# Patient Record
Sex: MALE | Race: WHITE | Employment: FULL TIME | URBAN - METROPOLITAN AREA
[De-identification: names, ages, dates, MRNs, and addresses within clinical notes are randomized per-mention and may not be internally consistent; named-entity substitution may affect disease eponyms.]

---

## 2017-11-07 ENCOUNTER — OFFICE VISIT (OUTPATIENT)
Dept: CARDIOLOGY CLINIC | Age: 57
End: 2017-11-07

## 2017-11-07 VITALS
DIASTOLIC BLOOD PRESSURE: 70 MMHG | SYSTOLIC BLOOD PRESSURE: 108 MMHG | HEART RATE: 72 BPM | WEIGHT: 183 LBS | HEIGHT: 71 IN | BODY MASS INDEX: 25.62 KG/M2

## 2017-11-07 DIAGNOSIS — Z86.73 STROKE, RECENT, WITHOUT LATE EFFECT: Primary | ICD-10-CM

## 2017-11-07 DIAGNOSIS — Z78.9 EXERCISE TOLERANCE FINDING: ICD-10-CM

## 2017-11-07 DIAGNOSIS — Z71.89 CARDIAC RISK COUNSELING: ICD-10-CM

## 2017-11-07 DIAGNOSIS — Z79.899 ON STATIN THERAPY: ICD-10-CM

## 2017-11-07 DIAGNOSIS — R00.2 PALPITATIONS: ICD-10-CM

## 2017-11-07 RX ORDER — CHOLECALCIFEROL (VITAMIN D3) 125 MCG
CAPSULE ORAL
COMMUNITY

## 2017-11-07 RX ORDER — SIMVASTATIN 5 MG/1
TABLET, FILM COATED ORAL
COMMUNITY

## 2017-11-07 RX ORDER — ASPIRIN 81 MG/1
TABLET ORAL DAILY
COMMUNITY

## 2017-11-07 NOTE — PATIENT INSTRUCTIONS
You will need a stress echocardiogram, Transesophageal echocardiogram, Coronary calcium score and follow up with Dr. Yolis Carrillo in 2 months

## 2017-11-07 NOTE — PROGRESS NOTES
Louis Hamilton     1960       Mika Katz MD, [facility]  Date of Visit-2017   PCP is Shauna Crenshaw MD   [facility] [place]  [facility]  HPI:  Louis Hamilton is a 61 y.o. male   Subjective:  New patient referral from Dr. Ephraim Homans. This gentleman is an avid exerciser, was training for a marathon, running up to 18 miles. On  he suffered a stroke spontaneously, was running, came back home, had a facial droop, lasted 3-4 hours. Went to [facility].  This resolved spontaneously without TPA, though he stayed there a few days. Workup was done per the patient and was unrevealing. He saw his neurologist, who recommended perhaps LUCITA and saw his PCP, who referred him to us. His PCP is Dr. Ephraim Homans. The patient reports no chest pain, chest pressure, PND, orthopnea, claudication, syncope, able to exercise prior to the stroke. He is very worried about going for future exercise. He has a family history of coronary disease. Stroke 10/11/17 with workup 10/11 to 10/13 at [facility].  Is now on aspirin, Simvastatin and vitamin. He said he got some malaise and higher heart rate from 60-80 and he stopped Simvastatin and got better, now is on 2.5. He also states that he's had no bleeding, visual changes, urinary frequency, hematuria or unusual myalgias. He says he had a calcium score with the [facility] last in . His EKG here today, 17, shows sinus rhythm, WNL. Medical History:  Chest discomfort in , [facility] workup, and stroke 17 at [facility]. No prior cath, blood transfusions or ulcers. Social History:  Nonsmoker. Cut back his alcohol to two per week. Drinks occasionally  caffeine. Enjoys golf and running. Works as an executive. Is , has three children. Family History: Mother 68, in good health. Father  of heart disease at 61. Allergies:  None.     ROS:  12 system ROS is positive for right hip arthritis and difficulty with memory at times. Impression/Plan:  1. Idiopathic stroke, unclear as to source. No obvious atrial fibrillation. He says he wears a FitBit. Never ever notices a higher heart rate. He's kept a blood pressure monitor running between .  2. Agree with LUCITA indicated given the need to look for embolic stroke. 3. Wants to proceed back to exercise. Has a family history with a stroke and vascular disease. Suggested he get a stress echocardiogram, also to risk stratify with calcium score since it's been eight years since the last one. 4. Continue aspirin and statin as tolerated. 5. Follow up with test results in 1-2 months. Patient Instructions   You will need a stress echocardiogram, Transesophageal echocardiogram, Coronary calcium score and follow up with Dr. Leeroy Garcia in 2 months      Future Appointments  Date Time Provider Patric Arthur   11/17/2017 10:45 AM CATH ROOM 4 [facility]         Impression:   1. Stroke, recent, without late effect    2. Exercise tolerance finding    3. Palpitations    4. Cardiac risk counseling    5. On statin therapy       Exam and Labs:  /70  Pulse 72  Ht 5' 11\" (1.803 m)  Wt 183 lb (83 kg)  BMI 25.52 kg/s2Xcsxollecgqhjv:  NAD, comfortable  Head: NC,AT. Eyes: No scleral icterus. Neck:  Neck supple. No JVD present. Throat: moist mucous membranes. Chest: Effort normal & normal respiratory excursion . Neurological: alert, conversant and oriented . Skin: Skin is not cold. No obvious systemic rash noted. Not diaphoretic. No erythema. Psychiatric:  Grossly normal mood and affect. Behavior appears normal. Extremities:  no clubbing or cyanosis. Abdomen: non distended    Lungs:breath sounds normal. No stridor. distress, wheezes or  Rales. Heart:    normal rate, regular rhythm, normal S1, S2, no murmurs, rubs, clicks or gallops , PMI non displaced. Edema: Edema is none.     Current Outpatient Prescriptions Medication Sig    aspirin delayed-release 81 mg tablet Take  by mouth daily.  simvastatin (ZOCOR) 5 mg tablet Take  by mouth nightly.  cholecalciferol, vitamin D3, (VITAMIN D3) 2,000 unit tab Take  by mouth. No current facility-administered medications for this visit. Impression see above.

## 2017-11-07 NOTE — MR AVS SNAPSHOT
Visit Information Date & Time Provider Department Dept. Phone Encounter #  
 11/7/2017  9:00 AM Angela Santos MD [facility] [phone] [id number] Upcoming Health Maintenance Date Due DTaP/Tdap/Td series (1 - Tdap) 5/4/1980 FOBT Q 1 YEAR AGE 50-75 5/4/2009 INFLUENZA AGE 9 TO ADULT 8/1/2017 Allergies as of 11/7/2017  Review Complete On: 11/7/2017 By: Roland Lino LPN No Known Allergies Current Immunizations  Never Reviewed No immunizations on file. Not reviewed this visit You Were Diagnosed With   
  
 Codes Comments Palpitations    -  Primary ICD-10-CM: R00.2 ICD-9-CM: 785.1 Vitals BP Pulse Height(growth percentile) Weight(growth percentile) BMI  
 108/70 72 5' 11\" (1.803 m) 183 lb (83 kg) 25.52 kg/m2 Vitals History BMI and BSA Data Body Mass Index Body Surface Area 25.52 kg/m 2 2.04 m 2 Your Updated Medication List  
  
   
This list is accurate as of: 11/7/17 11:00 AM.  Always use your most recent med list.  
  
  
  
  
 aspirin delayed-release 81 mg tablet Take  by mouth daily. simvastatin 5 mg tablet Commonly known as:  ZOCOR Take  by mouth nightly. VITAMIN D3 2,000 unit Tab Generic drug:  cholecalciferol (vitamin D3) Take  by mouth. We Performed the Following AMB POC EKG ROUTINE W/ 12 LEADS, INTER & REP [99498 CPT(R)] Patient Instructions You will need a stress echocardiogram, Transesophageal echocardiogram, Coronary calcium score and follow up with Dr. Iza Sykes in 2 months Introducing [facility] & HEALTH SERVICES! Yelena Lerner introduces [employer] patient portal. Now you can access parts of your medical record, email your doctor's office, and request medication refills online. 1. In your internet browser, go to [email or web address] 2. Click on the First Time User? Click Here link in the Sign In box.  You will see the New Member Sign Up page. 3. Enter your [employer] Access Code exactly as it appears below. You will not need to use this code after youve completed the sign-up process. If you do not sign up before the expiration date, you must request a new code. · [employer] Access Code: [email or web address] Expires: 1/31/2018  1:46 PM 
 
4. Enter the last four digits of your Social Security Number (xxxx) and Date of Birth (mm/dd/yyyy) as indicated and click Submit. You will be taken to the next sign-up page. 5. Create a Slipstreamt ID. This will be your [employer] login ID and cannot be changed, so think of one that is secure and easy to remember. 6. Create a [employer] password. You can change your password at any time. 7. Enter your Password Reset Question and Answer. This can be used at a later time if you forget your password. 8. Enter your e-mail address. You will receive e-mail notification when new information is available in [address] 9. Click Sign Up. You can now view and download portions of your medical record. 10. Click the Download Summary menu link to download a portable copy of your medical information. If you have questions, please visit the Frequently Asked Questions section of the [employer] website. Remember, [employer] is NOT to be used for urgent needs. For medical emergencies, dial 911. Now available from your iPhone and Android! Please provide this summary of care documentation to your next provider. If you have any questions after today's visit, please call [phone].

## 2017-11-09 ENCOUNTER — TELEPHONE (OUTPATIENT)
Dept: CARDIOLOGY CLINIC | Age: 57
End: 2017-11-09

## 2017-11-09 DIAGNOSIS — Z86.73 STROKE, RECENT, WITHOUT LATE EFFECT: Primary | ICD-10-CM

## 2017-11-09 NOTE — PROGRESS NOTES
Voice mail message left for patient to return call. No PHI left on message. AVS and written LUCITA instructions have been mailed to patient. Upon call back writer will review LUCITA instructions with patient. Awaiting call back.

## 2017-11-09 NOTE — TELEPHONE ENCOUNTER
Spoke with patient using 2 identifiers, name and . Patient notified per Dr. Tran Augustin Recommendation:LUCITA patient instructions reviewed with patient. AVS provider recommendations reviewed. Patient offered opportunity for questions. Declined. Pt. reported he would check calendar and let office know if unable to keep scheduled appointment.

## 2017-11-10 NOTE — TELEPHONE ENCOUNTER
Verified patient with two types of identifiers. Spoke to patient and he needs to reschedule the procedure to December. Will speak with Dr Leeroy Garcia to see when available to do procedure. He would also like to schedule his stress echo and calcium score. Gave number to schedule calcium score and will have PSR contact to schedule stress echo.

## 2017-11-13 NOTE — TELEPHONE ENCOUNTER
Verified patient with two types of identifiers. Rescheduled LUCITA for 12/1/17 with Dr Minal Young.  Parish Guerra prep and procedure info to patient. Also advised patient of stress echo time and date along with prep. Patient verbalizes understanding. And will call with any questions or concerns.

## 2017-11-28 RX ORDER — SODIUM CHLORIDE 9 MG/ML
75 INJECTION, SOLUTION INTRAVENOUS CONTINUOUS
Status: CANCELLED | OUTPATIENT
Start: 2017-11-28

## 2017-11-29 ENCOUNTER — CLINICAL SUPPORT (OUTPATIENT)
Dept: CARDIOLOGY CLINIC | Age: 57
End: 2017-11-29

## 2017-11-29 DIAGNOSIS — R00.2 PALPITATIONS: ICD-10-CM

## 2017-11-29 DIAGNOSIS — Z86.73 STROKE, RECENT, WITHOUT LATE EFFECT: ICD-10-CM

## 2017-12-01 ENCOUNTER — HOSPITAL ENCOUNTER (OUTPATIENT)
Dept: NON INVASIVE DIAGNOSTICS | Age: 57
Discharge: HOME | End: 2017-12-01
Attending: SPECIALIST | Admitting: SPECIALIST
Payer: COMMERCIAL

## 2017-12-01 VITALS
SYSTOLIC BLOOD PRESSURE: 106 MMHG | HEIGHT: 72 IN | DIASTOLIC BLOOD PRESSURE: 68 MMHG | BODY MASS INDEX: 24.52 KG/M2 | HEART RATE: 64 BPM | WEIGHT: 181 LBS | RESPIRATION RATE: 11 BRPM | OXYGEN SATURATION: 96 % | TEMPERATURE: 98 F

## 2017-12-01 LAB
ALBUMIN SERPL-MCNC: 4.1 G/DL (ref 3.5–5)
ALBUMIN/GLOB SERPL: 1.4 {RATIO} (ref 1.1–2.2)
ALP SERPL-CCNC: 66 U/L (ref 45–117)
ALT SERPL-CCNC: 40 U/L (ref 12–78)
ANION GAP SERPL CALC-SCNC: 6 MMOL/L (ref 5–15)
AST SERPL-CCNC: 24 U/L (ref 15–37)
BILIRUB SERPL-MCNC: 0.7 MG/DL (ref 0.2–1)
BUN SERPL-MCNC: 13 MG/DL (ref 6–20)
BUN/CREAT SERPL: 15 (ref 12–20)
CALCIUM SERPL-MCNC: 8.8 MG/DL (ref 8.5–10.1)
CHLORIDE SERPL-SCNC: 102 MMOL/L (ref 97–108)
CO2 SERPL-SCNC: 32 MMOL/L (ref 21–32)
CREAT SERPL-MCNC: 0.84 MG/DL (ref 0.7–1.3)
ERYTHROCYTE [DISTWIDTH] IN BLOOD BY AUTOMATED COUNT: 12.2 % (ref 11.5–14.5)
GLOBULIN SER CALC-MCNC: 3 G/DL (ref 2–4)
GLUCOSE SERPL-MCNC: 91 MG/DL (ref 65–100)
HCT VFR BLD AUTO: 41.9 % (ref 36.6–50.3)
HGB BLD-MCNC: 14.2 G/DL (ref 12.1–17)
MCH RBC QN AUTO: 30.9 PG (ref 26–34)
MCHC RBC AUTO-ENTMCNC: 33.9 G/DL (ref 30–36.5)
MCV RBC AUTO: 91.3 FL (ref 80–99)
PLATELET # BLD AUTO: 144 K/UL (ref 150–400)
POTASSIUM SERPL-SCNC: 4.2 MMOL/L (ref 3.5–5.1)
PROT SERPL-MCNC: 7.1 G/DL (ref 6.4–8.2)
RBC # BLD AUTO: 4.59 M/UL (ref 4.1–5.7)
SODIUM SERPL-SCNC: 140 MMOL/L (ref 136–145)
WBC # BLD AUTO: 5.6 K/UL (ref 4.1–11.1)

## 2017-12-01 PROCEDURE — 36415 COLL VENOUS BLD VENIPUNCTURE: CPT | Performed by: SPECIALIST

## 2017-12-01 PROCEDURE — 80053 COMPREHEN METABOLIC PANEL: CPT | Performed by: SPECIALIST

## 2017-12-01 PROCEDURE — 74011250636 HC RX REV CODE- 250/636: Performed by: SPECIALIST

## 2017-12-01 PROCEDURE — 85027 COMPLETE CBC AUTOMATED: CPT | Performed by: SPECIALIST

## 2017-12-01 PROCEDURE — 99152 MOD SED SAME PHYS/QHP 5/>YRS: CPT

## 2017-12-01 PROCEDURE — 93325 DOPPLER ECHO COLOR FLOW MAPG: CPT

## 2017-12-01 RX ORDER — SODIUM CHLORIDE 9 MG/ML
75 INJECTION, SOLUTION INTRAVENOUS CONTINUOUS
Status: DISCONTINUED | OUTPATIENT
Start: 2017-12-01 | End: 2017-12-01 | Stop reason: HOSPADM

## 2017-12-01 RX ORDER — FENTANYL CITRATE 50 UG/ML
25-200 INJECTION, SOLUTION INTRAMUSCULAR; INTRAVENOUS
Status: DISCONTINUED | OUTPATIENT
Start: 2017-12-01 | End: 2017-12-01 | Stop reason: ALTCHOICE

## 2017-12-01 RX ORDER — ATROPINE SULFATE 0.1 MG/ML
1 INJECTION INTRAVENOUS AS NEEDED
Status: DISCONTINUED | OUTPATIENT
Start: 2017-12-01 | End: 2017-12-01 | Stop reason: ALTCHOICE

## 2017-12-01 RX ORDER — SODIUM CHLORIDE 0.9 % (FLUSH) 0.9 %
10 SYRINGE (ML) INJECTION AS NEEDED
Status: DISCONTINUED | OUTPATIENT
Start: 2017-12-01 | End: 2017-12-01 | Stop reason: ALTCHOICE

## 2017-12-01 RX ORDER — MIDAZOLAM HYDROCHLORIDE 1 MG/ML
1-10 INJECTION, SOLUTION INTRAMUSCULAR; INTRAVENOUS
Status: DISCONTINUED | OUTPATIENT
Start: 2017-12-01 | End: 2017-12-01 | Stop reason: ALTCHOICE

## 2017-12-01 RX ADMIN — MIDAZOLAM HYDROCHLORIDE 1 MG: 1 INJECTION, SOLUTION INTRAMUSCULAR; INTRAVENOUS at 10:15

## 2017-12-01 RX ADMIN — FENTANYL CITRATE 50 MCG: 50 INJECTION, SOLUTION INTRAMUSCULAR; INTRAVENOUS at 10:07

## 2017-12-01 RX ADMIN — MIDAZOLAM HYDROCHLORIDE 1 MG: 1 INJECTION, SOLUTION INTRAMUSCULAR; INTRAVENOUS at 10:20

## 2017-12-01 RX ADMIN — MIDAZOLAM HYDROCHLORIDE 1 MG: 1 INJECTION, SOLUTION INTRAMUSCULAR; INTRAVENOUS at 10:13

## 2017-12-01 RX ADMIN — FENTANYL CITRATE 25 MCG: 50 INJECTION, SOLUTION INTRAMUSCULAR; INTRAVENOUS at 10:15

## 2017-12-01 RX ADMIN — MIDAZOLAM HYDROCHLORIDE 1 MG: 1 INJECTION, SOLUTION INTRAMUSCULAR; INTRAVENOUS at 10:17

## 2017-12-01 RX ADMIN — MIDAZOLAM HYDROCHLORIDE 1 MG: 1 INJECTION, SOLUTION INTRAMUSCULAR; INTRAVENOUS at 10:10

## 2017-12-01 RX ADMIN — FENTANYL CITRATE 25 MCG: 50 INJECTION, SOLUTION INTRAMUSCULAR; INTRAVENOUS at 10:10

## 2017-12-01 RX ADMIN — MIDAZOLAM HYDROCHLORIDE 1 MG: 1 INJECTION, SOLUTION INTRAMUSCULAR; INTRAVENOUS at 10:07

## 2017-12-01 NOTE — PROGRESS NOTES
Cardiac Cath Lab Recovery Arrival Note:      Meka Staley arrived to Cardiac Cath Lab, Recovery Area. Staff introduced to patient. Patient identifiers verified with NAME and DATE OF BIRTH. Procedure verified with patient. Consent forms reviewed and signed by patient or authorized representative and verified. Allergies verified. Patient and family oriented to department. Patient and family informed of procedure and plan of care. Questions answered with review. Patient prepped for procedure, per orders from physician, prior to arrival.    Patient on cardiac monitor, non-invasive blood pressure, SPO2 monitor. On Room Air. Patient is A&Ox 4. Patient reports No Pain. Patient in stretcher, in low position, with side rails up, call bell within reach, patient instructed to call if assistance as needed. Patient prep in: [address], [place]. Family in: Waiting Room.    Prep by: Jayla Villalobos RN

## 2017-12-01 NOTE — PROGRESS NOTES
TRANSFER - IN REPORT:    Verbal report received from [facility]. on Louis Hamilton  being received from procedure area for routine progression of care. Report consisted of patients Situation, Background, Assessment and Recommendations(SBAR). Information from the following report(s) SBAR, Recent Results and Cardiac Rhythm NSR was reviewed with the receiving clinician. Opportunity for questions and clarification was provided. Assessment completed upon patients arrival to [address] [place] [address] and care assumed. Cardiac Cath Lab Recovery Arrival Note:    Louis Hamilton arrived to [facility] recovery area. Patient procedure= LUCITA. Patient on cardiac monitor, non-invasive blood pressure, SPO2 monitor. On  O2 @ 2 lpm via NC.  IV  of NS on pump at 25 ml/hr. Patient status doing well without problems. Patient is A&Ox 4. Patient reports No Pain. PROCEDURE SITE CHECK:    Procedure site:No pain/discomfort reported at procedure site. No change in patient status. Continue to monitor patient and status.

## 2017-12-01 NOTE — PROGRESS NOTES
Pt verbalized understanding of discharge instructions. Pt with no complaints of discomfort. PIV removed;noredness or swelling at site. Guaze with tape placed to IV site. Pt escorted to car with belongings via wheelchair.

## 2017-12-01 NOTE — IP AVS SNAPSHOT
[facility] [address] [place] [address] 
[phone] Patient: Daphne Ridley MRN: [id number] LMN:7/0/2247 About your hospitalization You were admitted on:  December 1, 2017 You last received care in the:  [facility] ELECTROPHYSIOLOGY You were discharged on:  December 1, 2017 Why you were hospitalized Your primary diagnosis was:  Not on File Things You Need To Do (next 8 weeks) Follow up with Ira Hemphill MD  
  
Phone:  [phone] Where:  [facility] [address] Follow up with Ray Hoover MD in 6 month(s) Phone:  [phone] Where:  [facility], [facility] [address], [facility] [address] Discharge Orders None A check dina indicates which time of day the medication should be taken. My Medications ASK your physician about these medications Instructions Each Dose to Equal  
 Morning Noon Evening Bedtime  
 aspirin delayed-release 81 mg tablet Your last dose was: Your next dose is: Take  by mouth daily. simvastatin 5 mg tablet Commonly known as:  ZOCOR Your last dose was: Your next dose is: Take  by mouth nightly. VITAMIN D3 2,000 unit Tab Generic drug:  cholecalciferol (vitamin D3) Your last dose was: Your next dose is: Take  by mouth. Discharge Instructions None Introducing [facility] & HEALTH SERVICES! Dear Vasquez Carbone: Thank you for requesting a [employer] account. Our records indicate that you already have an active [employer] account. You can access your account anytime at [email or web address] Did you know that you can access your hospital and ER discharge instructions at any time in [employer]? You can also review all of your test results from your hospital stay or ER visit. Additional Information If you have questions, please visit the Frequently Asked Questions section of the User[employer] website at [email or web address] Remember, MyChart is NOT to be used for urgent needs. For medical emergencies, dial 911. Now available from your iPhone and Android! Providers Seen During Your Hospitalization Provider Specialty Primary office phone Andrew Paul MD Cardiology [phone] Your Primary Care Physician (PCP) Primary Care Physician Office Phone Office Fax  
 Winter gallardo [phone] You are allergic to the following No active allergies Recent Documentation Height Weight BMI  
  
  
 1.829 m 82.1 kg 24.55 kg/m2 Emergency Contacts Name Discharge Info Relation Home Work Mobile [address] [place] [address] CAREGIVER [3] Spouse [3] [phone] Patient Belongings The following personal items are in your possession at time of discharge: 
     Visual Aid: Glasses Please provide this summary of care documentation to your next provider. Signatures-by signing, you are acknowledging that this After Visit Summary has been reviewed with you and you have received a copy. Patient Signature:  ____________________________________________________________ Date:  ____________________________________________________________  
  
Preston Heck Provider Signature:  ____________________________________________________________ Date:  ____________________________________________________________

## 2017-12-01 NOTE — PROGRESS NOTES
Cardiac Cath Lab Procedure Area Arrival Note:    Ginger Herndon arrived to Cardiac Cath Lab, Procedure Area. Patient identifiers verified with NAME and DATE OF BIRTH. Procedure verified with patient. Consent forms verified. Allergies verified. Patient informed of procedure and plan of care. Questions answered with review. Patient voiced understanding of procedure and plan of care. Patient on cardiac monitor, non-invasive blood pressure, SPO2 monitor. On O2 @ 2 lpm via NC.  IV of NS on pump at 25 ml/hr. Patient status doing well without problems. Patient is A&Ox 4. Patient reports no pain. Patient medicated during procedure with orders obtained and verified by Dr. Zheng Richard.    Refer to patients Cardiac Cath Lab PROCEDURE REPORT for vital signs, assessment, status, and response during procedure, printed at end of case. Printed report on chart or scanned into chart. TRANSFER - OUT REPORT:    Verbal report given to Shelley Metzger on Ginger Herndon being transferred to cath lab recovery for routine progression of care       Report consisted of patients Situation, Background, Assessment and   Recommendations(SBAR). Information from the following report(s) Procedure Summary was reviewed with the receiving nurse. Opportunity for questions and clarification was provided.

## 2017-12-01 NOTE — IP AVS SNAPSHOT
[address] 
[phone] Patient: Chon Pedro MRN: [id number] GHY:2[birth year]29 My Medications ASK your physician about these medications Instructions Each Dose to Equal  
 Morning Noon Evening Bedtime  
 aspirin delayed-release 81 mg tablet Your last dose was: Your next dose is: Take  by mouth daily. simvastatin 5 mg tablet Commonly known as:  ZOCOR Your last dose was: Your next dose is: Take  by mouth nightly. VITAMIN D3 2,000 unit Tab Generic drug:  cholecalciferol (vitamin D3) Your last dose was: Your next dose is: Take  by mouth.

## 2017-12-05 ENCOUNTER — TELEPHONE (OUTPATIENT)
Dept: CARDIOLOGY CLINIC | Age: 57
End: 2017-12-05

## 2018-04-16 ENCOUNTER — OFFICE VISIT (OUTPATIENT)
Dept: CARDIOLOGY CLINIC | Age: 58
End: 2018-04-16

## 2018-04-16 VITALS
WEIGHT: 180.2 LBS | HEART RATE: 60 BPM | OXYGEN SATURATION: 97 % | RESPIRATION RATE: 12 BRPM | HEIGHT: 72 IN | SYSTOLIC BLOOD PRESSURE: 126 MMHG | DIASTOLIC BLOOD PRESSURE: 70 MMHG | BODY MASS INDEX: 24.41 KG/M2

## 2018-04-16 DIAGNOSIS — Z86.73 STROKE, RECENT, WITHOUT LATE EFFECT: Primary | ICD-10-CM

## 2018-04-16 DIAGNOSIS — Z79.899 ON STATIN THERAPY: ICD-10-CM

## 2018-04-16 DIAGNOSIS — Z71.89 CARDIAC RISK COUNSELING: ICD-10-CM

## 2018-04-16 DIAGNOSIS — R00.2 PALPITATIONS: ICD-10-CM

## 2018-04-16 NOTE — MR AVS SNAPSHOT
[address] [facility] [address] [place] 
[phone] Patient: Emerald Rg MRN: [id number] LCP:[birth year]995 Visit Information Date & Time Provider Department Dept. Phone Encounter #  
 4/16/2018  1:00 PM Jerson Carrasquillo MD [facility] [phone] [id number] Your Appointments 5/7/2018  8:20 AM  
ESTABLISHED PATIENT with Jerson Carrasquillo MD  
[facility] (KUMAR SCHEDULING) Appt Note: 6mo fu  
 [address] [facility]  
[address] [facility] 7 [address] Upcoming Health Maintenance Date Due Hepatitis C Screening 1960 DTaP/Tdap/Td series (1 - Tdap) 5/4/1980 FOBT Q 1 YEAR AGE 50-75 5/4/2009 Influenza Age 5 to Adult 8/1/2017 Allergies as of 4/16/2018  Review Complete On: 4/16/2018 By: Rita Parra RN No Known Allergies Current Immunizations  Never Reviewed No immunizations on file. Not reviewed this visit You Were Diagnosed With   
  
 Codes Comments Stroke, recent, without late effect    -  Primary ICD-10-CM: Z86.73 
ICD-9-CM: V12.54 Palpitations     ICD-10-CM: R00.2 ICD-9-CM: 785.1 On statin therapy     ICD-10-CM: Z79.899 ICD-9-CM: V58.69 Vitals BP Pulse Resp Height(growth percentile) Weight(growth percentile) SpO2  
 126/70 (BP 1 Location: Right arm, BP Patient Position: Sitting) 60 12 6' (1.829 m) 180 lb 3.2 oz (81.7 kg) 97% BMI Smoking Status 24.44 kg/m2 Never Smoker Vitals History BMI and BSA Data Body Mass Index Body Surface Area  
 24.44 kg/m 2 2.04 m 2 Preferred Pharmacy Pharmacy Name Phone [facility] [address], [address] [phone] Your Updated Medication List  
  
   
This list is accurate as of 4/16/18  2:10 PM.  Always use your most recent med list.  
 aspirin delayed-release 81 mg tablet Take  by mouth daily. simvastatin 5 mg tablet Commonly known as:  ZOCOR Take  by mouth nightly. VITAMIN D3 2,000 unit Tab Generic drug:  cholecalciferol (vitamin D3) Take  by mouth. We Performed the Following AMB POC EKG ROUTINE W/ 12 LEADS, INTER & REP [90841 CPT(R)] Patient Instructions Please follow up with cardiology as needed Introducing [facility] & HEALTH SERVICES! Dear Jahaira Meyer: Thank you for requesting a [employer] account. Our records indicate that you already have an active [employer] account. You can access your account anytime at [email or web address] Did you know that you can access your hospital and ER discharge instructions at any time in [employer]? You can also review all of your test results from your hospital stay or ER visit. Additional Information If you have questions, please visit the Frequently Asked Questions section of the [employer] website at [email or web address] Remember, [employer] is NOT to be used for urgent needs. For medical emergencies, dial 911. Now available from your iPhone and Android! Please provide this summary of care documentation to your next provider. Your primary care clinician is listed as Queen Grecia. If you have any questions after today's visit, please call [phone].

## 2018-04-16 NOTE — PROGRESS NOTES
Chief Complaint   Patient presents with    Cholesterol Problem     2 month follow up (overdue). Denies chest pain/shortness of breath/dizziness/swelling. Suffering from \"allergies\". Plays golf daily.

## 2018-04-16 NOTE — PROGRESS NOTES
Dejon Branch     1960       Mika Bob MD, [facility]  Date of Visit-4/16/2018   PCP is Zeeshan Garrido MD   [facility] [place]  [facility]  HPI:  Dejon Branch is a 61 y.o. male   2 month follow up    Overall the pt states he is doing well. He reports that he is exercising but is just doing some light exercise. The pt notes that he is doing well with exercising and has no problems. He inquired about his recent lab work as he was anemic. Denies chest pain, edema, syncope or shortness of breath at rest, has no tachycardia, palpitations or sense of arrhythmia. EKG: NSR WNL  Labs from Zeeshan Garrido MD reviewed and to be scanned   3-26-18= Hgb 14 K 3.9 Creat 0.9   HDL 55 TG 37 LDL 72  Assessment/Plan:       CVA October 11 2017 while running, facial droop 3-4 hours , LUCITA 12-1-17 WNL , ML 11-29-17-Exercise stress echo is normal. Walked 15 minutes     TIA post exercise, workup with LUCITA and stress echo, normal.   Continues on simvastatin and aspirin, no further events nothing to suggest arrhythmia and has been exercising since then. Will see back as needed. Defer to PCP        Impression:   1. Stroke, recent, without late effect    2. Palpitations    3. On statin therapy              ROS-except as noted above. . A complete cardiac and respiratory are reviewed and negative except as above ; Resp-denies wheezing  or productive cough,. Const- No unusual weight loss or fever; Neuro-no recent seizure or CVA ; GI- No BRBPR, abdom pain, bloating ; - no  hematuria   see supplement sheet, initialed and to be scanned by staff  Past Medical History:   Diagnosis Date    Stroke, recent, without late effect 11/7/2017      Social Hx= reports that he has never smoked. He does not have any smokeless tobacco history on file.      Exam and Labs:  /70 (BP 1 Location: Right arm, BP Patient Position: Sitting)  Pulse 60  Resp 12  Ht 6' (1.829 m)  Wt 180 lb 3.2 oz (81.7 kg)  SpO2 97%  BMI 24.44 kg/t7Nmtintwvhadseq:  NAD, comfortable  Head: NC,AT. Eyes: No scleral icterus. Neck:  Neck supple. No JVD present. Throat: moist mucous membranes. Chest: Effort normal & normal respiratory excursion . Neurological: alert, conversant and oriented . Skin: Skin is not cold. No obvious systemic rash noted. Not diaphoretic. No erythema. Psychiatric:  Grossly normal mood and affect. Behavior appears normal. Extremities:  no clubbing or cyanosis. Abdomen: non distended    Lungs:breath sounds normal. No stridor. distress, wheezes or  Rales. Heart: normal rate, regular rhythm, normal S1, S2, no murmurs, rubs, clicks or gallops , PMI non displaced. Edema: Edema is none. Wt Readings from Last 3 Encounters:   04/16/18 180 lb 3.2 oz (81.7 kg)   12/01/17 181 lb (82.1 kg)   11/07/17 183 lb (83 kg)      BP Readings from Last 3 Encounters:   04/16/18 126/70   12/01/17 106/68   11/07/17 108/70      Current Outpatient Prescriptions   Medication Sig    aspirin delayed-release 81 mg tablet Take  by mouth daily.  simvastatin (ZOCOR) 5 mg tablet Take  by mouth nightly.  cholecalciferol, vitamin D3, (VITAMIN D3) 2,000 unit tab Take  by mouth. No current facility-administered medications for this visit. Impression see above.       Written by Santos Rasmussen, as dictated by Gerald Jacob MD.